# Patient Record
Sex: MALE | Race: WHITE | NOT HISPANIC OR LATINO | Employment: FULL TIME | URBAN - METROPOLITAN AREA
[De-identification: names, ages, dates, MRNs, and addresses within clinical notes are randomized per-mention and may not be internally consistent; named-entity substitution may affect disease eponyms.]

---

## 2021-10-01 ENCOUNTER — OFFICE VISIT (OUTPATIENT)
Dept: PODIATRY | Facility: CLINIC | Age: 31
End: 2021-10-01
Payer: COMMERCIAL

## 2021-10-01 VITALS
BODY MASS INDEX: 29.26 KG/M2 | SYSTOLIC BLOOD PRESSURE: 118 MMHG | WEIGHT: 216 LBS | DIASTOLIC BLOOD PRESSURE: 70 MMHG | HEIGHT: 72 IN

## 2021-10-01 DIAGNOSIS — T14.8XXA BLISTER: Primary | ICD-10-CM

## 2021-10-01 PROCEDURE — 99203 OFFICE O/P NEW LOW 30 MIN: CPT | Performed by: PODIATRIST

## 2021-10-01 ASSESSMENT — MIFFLIN-ST. JEOR: SCORE: 1972.77

## 2021-10-01 NOTE — PROGRESS NOTES
Foot & Ankle Surgery  October 1, 2021    CC: blisters on feet    I was asked to see Mando White regarding the chief complaint by:  self    HPI:  Pt is a 31 year old male who presents with above complaint.  Blistering on toes that develops with prolonged time on his feet.  He walks his dog regularly for miles.  He has tried activity changes, shoe gear changes, some padding.    ROS:   Pos for CC.  The patient denies current nausea, vomiting, chills, fevers, belly pain, calf pain, chest pain or SOB.  Complete remainder of ROS is otherwise neg.    VITALS:    Vitals:    10/01/21 0935   BP: 118/70   Weight: 98 kg (216 lb)   Height: 1.829 m (6')       PMH:    Past Medical History:   Diagnosis Date     Anxiety      Concussion with no loss of consciousness      Irritable bowel syndrome      Shoulder instability        SXHX:    Past Surgical History:   Procedure Laterality Date     AS ESOPHAGOSCOPY, DIAGNOSTIC       COLONOSCOPY       ENT SURGERY      septoplasty     LE FORT ONE N/A 12/10/2015    Procedure: LE FORT ONE;  Surgeon: Candido White DDS;  Location: [facility] OR     wisdom teeth extraction          MEDS:    Current Outpatient Medications   Medication     Acetaminophen (TYLENOL PO)     ASCORBIC ACID PO     Carboxymethylcellul-Glycerin (REFRESH OPTIVE OP)     ClonazePAM (KLONOPIN PO)     Cyclobenzaprine HCl (FLEXERIL PO)     hyoscyamine (ANASPAZ,LEVSIN) 0.125 MG tablet     IBUPROFEN PO     multivitamin, therapeutic with minerals (THERA-VIT-M) TABS     oxyCODONE (ROXICODONE) 5 MG/5ML solution     oxyCODONE (ROXICODONE) 5 MG/5ML solution     PANTOPRAZOLE SODIUM PO     No current facility-administered medications for this visit.       ALL:     Allergies   Allergen Reactions     Carbamazepine Nausea and Vomiting     Venlafaxine Nausea and Vomiting     Sulfa Drugs Rash       FMH:  No family history on file.    SocHx:    Social History     Socioeconomic History     Marital status: Single     Spouse name: Not on  file     Number of children: Not on file     Years of education: Not on file     Highest education level: Not on file   Occupational History     Not on file   Tobacco Use     Smoking status: Never Smoker     Smokeless tobacco: Never Used   Substance and Sexual Activity     Alcohol use: Yes     Comment: 5 beers a weekend     Drug use: No     Sexual activity: Not on file   Other Topics Concern     Not on file   Social History Narrative     Not on file     Social Determinants of Health     Financial Resource Strain:      Difficulty of Paying Living Expenses:    Food Insecurity:      Worried About Running Out of Food in the Last Year:      Ran Out of Food in the Last Year:    Transportation Needs:      Lack of Transportation (Medical):      Lack of Transportation (Non-Medical):    Physical Activity:      Days of Exercise per Week:      Minutes of Exercise per Session:    Stress:      Feeling of Stress :    Social Connections:      Frequency of Communication with Friends and Family:      Frequency of Social Gatherings with Friends and Family:      Attends [employer] Services:      Active Member of Clubs or Organizations:      Attends Club or Organization Meetings:      Marital Status:    Intimate Partner Violence:      Fear of Current or Ex-Partner:      Emotionally Abused:      Physically Abused:      Sexually Abused:            EXAMINATION:  Gen:   No apparent distress  Neuro:   A&Ox3, no deficits  Psych:    Answering questions appropriately for age and situation with normal affect  Head:    NCAT  Eye:    Visual scanning without deficit  Ear:    Response to auditory stimuli wnl  Lung:    Non-labored breathing on RA noted  Abd:    NTND per patient report  Lymph:    Neg for pitting/non-pitting edema BLE  Vasc:    Pulses palpable, CFT minimally delayed  Neuro:    Light touch sensation intact to all sensory nerve distributions without paresthesias  Derm:    Blisters on multiple toes bilateral, including a pinch  callus/blister on the right fourth toe as well as a superficial blister around the left toenail.  There is no local signs of infection noted anywhere.  He also does have mild callusing/thickening of skin interdigitally at the lateral right fourth toe  MSK:    ROM, strength wnl without limitation, no pain on palpation noted.  Calf:    Neg for redness, swelling or tenderness    Assessment:  31 year old male with blisters on multiple toes after prolonged time on his feet      Plan:  Discussed etiologies, anatomy and options  1.  Blisters on multiple toes after prolonged time on his feet  -I personally reviewed and interpreted the patient's lower extremity history pertinent to today's visit, including imaging/labs, in preparation for initiating a treatment program.  -We discussed that with prolonged time, atypical to have skin changes, including callusing blistering or subungual hematomas.  Stopping activities would stop these problems but that is oftentimes an illogical recommendation  -Regarding shoe gear, I do not typically recommend specific brands or model numbers, but rather recommend making shoe gear selection simply based on comfort  -Our OTC insert handout was dispensed for arch support and pressure/shearing force relief.  We also discussed the option for custom orthotics.  He will call as needed for an order  -We discussed interdigital spacers or padding options for the right fourth toe as well as possible surgical option for this.  I do not appreciate a significant bony prominence, so no indication for surgery as of now  -We discussed utilizing moleskin as well.    Follow up:  prn or sooner with acute issues      Patient's medical history was reviewed today      Jose John DPM FACFAS FACFAOM  Podiatric Foot & Ankle Surgeon  [facility]  [phone]    Disclaimer: This note consists of symbols derived from keyboarding, dictation and/or voice recognition software. As a result, there may  be errors in the script that have gone undetected. Please consider this when interpreting information found in this chart.

## 2021-10-01 NOTE — PATIENT INSTRUCTIONS
Thank you for choosing [facility] Podiatry / Foot & Ankle Surgery!    DR DEL ANGEL[facility]S CLINIC LOCATIONS  [facility]   [address]   [place] [address] [place] [address] [phone]       SET UP SURGERY: [phone]    APPOINTMENTS: [phone]    BILLING QUESTIONS: [phone]    FAX NUMBER: [phone]        OVER THE COUNTER INSERTS  Most of these can be found at your local [employer], [employer], sporting [employer], or online:    Powerset   Sofsole Fit BeMo   Power Step   Walk-Fit ([employer])  *For heel pain* Arch Cradles  *For heel pain*       ** A good high quality over the counter insert should cost around $40-$50    ** Capsulitis/Metarasalgia - try adding a metatarsal pad on your inserts or find an insert with one built in        [employer] [facility] LOCATIONS  [facility]  [address]  [place] [address]  Phone: [phone]  Fax: [phone] [facility]  [address]  [place] [address]  Phone: [phone]   Fax: [phone]   [facility]  [address]  [place] [address]  Phone: [phone]  Fax: [phone] [facility]  [address]  [place] [address]  Phone: [phone]   Fax: [phone]   [facility]   [address]  [place] [address]  Phone: [phone]  Fax: [phone] * Please call any location listed to make an appointment for a casting/fitting. Your referral was sent to their central office and they will all have the order on file.       [email or web address] or call [email or web address]  TOE [email or web address]        [email or web address] or call 6-929-PEDIFIX  TOE SPACERS

## 2021-11-28 ENCOUNTER — HEALTH MAINTENANCE LETTER (OUTPATIENT)
Age: 31
End: 2021-11-28

## 2022-09-11 ENCOUNTER — HEALTH MAINTENANCE LETTER (OUTPATIENT)
Age: 32
End: 2022-09-11

## 2023-01-22 ENCOUNTER — HEALTH MAINTENANCE LETTER (OUTPATIENT)
Age: 33
End: 2023-01-22

## 2023-03-15 ENCOUNTER — TRANSCRIBE ORDERS (OUTPATIENT)
Dept: OTHER | Age: 33
End: 2023-03-15

## 2023-03-15 DIAGNOSIS — M76.62 TENDONITIS, ACHILLES, LEFT: Primary | ICD-10-CM

## 2023-03-27 ENCOUNTER — THERAPY VISIT (OUTPATIENT)
Dept: PHYSICAL THERAPY | Facility: CLINIC | Age: 33
End: 2023-03-27
Attending: FAMILY MEDICINE
Payer: COMMERCIAL

## 2023-03-27 DIAGNOSIS — M76.62 TENDONITIS, ACHILLES, LEFT: ICD-10-CM

## 2023-03-27 PROCEDURE — 97110 THERAPEUTIC EXERCISES: CPT | Mod: 59 | Performed by: PHYSICAL THERAPIST

## 2023-03-27 PROCEDURE — 97161 PT EVAL LOW COMPLEX 20 MIN: CPT | Mod: GP | Performed by: PHYSICAL THERAPIST

## 2023-03-27 PROCEDURE — 97530 THERAPEUTIC ACTIVITIES: CPT | Mod: GP | Performed by: PHYSICAL THERAPIST

## 2023-03-27 NOTE — PROGRESS NOTES
Physical Therapy Initial Evaluation  Subjective:  The history is provided by the patient. No  was used.   Therapist Generated HPI Evaluation  Problem details: Pt presents to PT with c/o Left Achilles tendinitis.  He notes increased pain with certain activities such as after walking the dogs and when doing certain stretches.  Pt reports it may be due to moving a month ago.  He has had issues with plantar fasciitis and achilles pain in the past.      He is able to walk his dogs 5 miles but notes some swelling after activities.  He has not been going to the gym for strength workouts the past week and notes some improvement in pain.     .         Type of problem:  Left foot.    This is a new condition.  Condition occurred with:  Repetition/overuse.  Where condition occurred: at home.  Patient reports pain:  Posterior.  Pain is described as burning and aching and is intermittent.  Pain radiates to:  No radiation.   Since onset symptoms are gradually improving.  Associated symptoms:  Edema and loss of strength. Symptoms are exacerbated by running  and relieved by NSAID's.      Restrictions due to condition include:  Working in normal job without restrictions.  Barriers include:  None as reported by patient.                        Objective:  System    Ankle/Foot Evaluation  ROM:  Arom ankle eval: danyelle pes planus.  AROM:    Dorsiflexion:  Left:   Wnl, end range discomfort  Right:   Wnl  Plantarflexion: Left:  Wnl    Right:   Inversion: Left:  Wnl     Right:   Eversion: Wnl     Right:         Strength:    Dorsiflexion:  Left: 5/5     Pain:     Plantarflexion: Left: 5/5   Pain:     Inversion:Left: 5/5  Pain:       Eversion:Left: 5/5  Pain:                    LIGAMENT TESTING: not assessed                PALPATION:   Left ankle tenderness present at:  gastroc/soleus and achilles tendon    EDEMA: Edema ankle: slight swelling present AT insertion.            FUNCTIONAL TESTS:         Quad:  Single Leg Squat  Left: Control is moderate loss of control.  Single Leg Squat Right: Control is mild loss of control  Bilateral Leg Squat:  Control is normal control                                                        General     ROS    Assessment/Plan:    Patient is a 32 year old male with left side ankle complaints.    Patient has the following significant findings with corresponding treatment plan.                Diagnosis 1:  Achilles tendinitis  Pain -  hot/cold therapy, manual therapy, self management and education  Decreased strength - therapeutic exercise and therapeutic activities    Therapy Evaluation Codes:   1) History comprised of:   Personal factors that impact the plan of care:      None.    Comorbidity factors that impact the plan of care are:      Migraines/headaches.     Medications impacting care: sulfa.  2) Examination of Body Systems comprised of:   Body structures and functions that impact the plan of care:      Ankle.   Activity limitations that impact the plan of care are:      Jumping, Running and Sports.  3) Clinical presentation characteristics are:   Stable/Uncomplicated.  4) Decision-Making    Low complexity using standardized patient assessment instrument and/or measureable assessment of functional outcome.  Cumulative Therapy Evaluation is: Low complexity.    Previous and current functional limitations:  (See Goal Flow Sheet for this information)    Short term and Long term goals: (See Goal Flow Sheet for this information)     Communication ability:  Patient appears to be able to clearly communicate and understand verbal and written communication and follow directions correctly.  Treatment Explanation - The following has been discussed with the patient:   RX ordered/plan of care  Anticipated outcomes  Possible risks and side effects  This patient would benefit from PT intervention to resume normal activities.   Rehab potential is good.    Frequency:  1 X week, once daily  Duration:  for 2 weeks  tapering to 2 X a month over 4 weeks  Discharge Plan:  Achieve all LTG.  Independent in home treatment program.  Reach maximal therapeutic benefit.    Please refer to the daily flowsheet for treatment today, total treatment time and time spent performing 1:1 timed codes.

## 2023-05-16 NOTE — PROGRESS NOTES
Subjective:  HPI  Physical Exam                    Objective:  System    Physical Exam    General     ROS    Assessment/Plan:    DISCHARGE REPORT    Progress reporting period is from 3/27/23 to 5/16/23.       SUBJECTIVE  Subjective changes noted by patient:  .  Subjective: see IE    Current pain level is 0/10 Current Pain level: 0/10.     Previous pain level was  3/10 Initial Pain level: 3/10.   Changes in function:  Unknown   Adverse reaction to treatment or activity: None    OBJECTIVE  Changes noted in objective findings:  Patient has failed to return to therapy so current objective findings are unknown.  Objective: see IE     ASSESSMENT/PLAN  Updated problem list and treatment plan: Diagnosis 1:  Achilles pain  Pain -  hot/cold therapy, self management and home program  STG/LTGs have been met or progress has been made towards goals:  Yes (See Goal flow sheet completed today.)  Assessment of Progress: Patient has not returned to therapy.  Current status is unknown and discharge G code cannot be reported.  Self Management Plans:  Patient has been instructed in a home treatment program.  Pt has not returned to PT  Mando continues to require the following intervention to meet STG and LTG's:  PT intervention is no longer required to meet STG/LTG.    Recommendations:  This patient is ready to be discharged from therapy and continue their home treatment program.    Please refer to the daily flowsheet for treatment today, total treatment time and time spent performing 1:1 timed codes.

## 2024-02-24 ENCOUNTER — HEALTH MAINTENANCE LETTER (OUTPATIENT)
Age: 34
End: 2024-02-24

## 2025-03-09 ENCOUNTER — HEALTH MAINTENANCE LETTER (OUTPATIENT)
Age: 35
End: 2025-03-09